# Patient Record
Sex: FEMALE
[De-identification: names, ages, dates, MRNs, and addresses within clinical notes are randomized per-mention and may not be internally consistent; named-entity substitution may affect disease eponyms.]

---

## 2019-09-06 ENCOUNTER — TRANSCRIPTION ENCOUNTER (OUTPATIENT)
Age: 19
End: 2019-09-06

## 2020-05-27 ENCOUNTER — TRANSCRIPTION ENCOUNTER (OUTPATIENT)
Age: 20
End: 2020-05-27

## 2024-01-13 ENCOUNTER — NON-APPOINTMENT (OUTPATIENT)
Age: 24
End: 2024-01-13

## 2024-01-13 ENCOUNTER — APPOINTMENT (OUTPATIENT)
Dept: ORTHOPEDIC SURGERY | Facility: CLINIC | Age: 24
End: 2024-01-13
Payer: COMMERCIAL

## 2024-01-13 DIAGNOSIS — S93.401A SPRAIN OF UNSPECIFIED LIGAMENT OF RIGHT ANKLE, INITIAL ENCOUNTER: ICD-10-CM

## 2024-01-13 PROBLEM — Z00.00 ENCOUNTER FOR PREVENTIVE HEALTH EXAMINATION: Status: ACTIVE | Noted: 2024-01-13

## 2024-01-13 PROCEDURE — L4361: CPT | Mod: RT

## 2024-01-13 PROCEDURE — 73610 X-RAY EXAM OF ANKLE: CPT | Mod: RT

## 2024-01-13 PROCEDURE — 99203 OFFICE O/P NEW LOW 30 MIN: CPT

## 2024-01-13 PROCEDURE — 73630 X-RAY EXAM OF FOOT: CPT | Mod: RT

## 2024-01-13 RX ORDER — MELOXICAM 15 MG/1
15 TABLET ORAL
Qty: 30 | Refills: 0 | Status: ACTIVE | COMMUNITY
Start: 2024-01-13 | End: 1900-01-01

## 2024-01-13 NOTE — PHYSICAL EXAM
[de-identified] : Physical exam of the right ankle: -Moderate swelling over the lateral aspect and anterior aspect of the ankle joint.  Skin intact -Tenderness to palpation along the ATFL, PTFL, anterior joint line and mild tenderness over the lateral malleolus.  No tenderness over medial malleolus, deltoid ligaments, foot. -Patient has limited range of motion due to swelling and pain -+2 dorsalis pedis pulse  -Sensation intact to light touch

## 2024-01-13 NOTE — DISCUSSION/SUMMARY
[de-identified] : Patient has a sprain of the right ankle.  At this time I placed patient a tall cam walking boot to weight-bear as tolerated.  Patient encouraged to elevate the foot, ice and I am prescribing naproxen 500 mg to take as needed for pain and inflammation.  Encouraged her to take with food as it is started on the stomach.  Patient was advised that this takes 4 to 6 weeks to fully heal and the first 2 weeks may be the worst.  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  Patient will follow-up in 3 to 4 weeks with me if needed.  Patient agrees to above plan all questions were answered today

## 2024-01-13 NOTE — DATA REVIEWED
[FreeTextEntry1] : X-ray images were obtained at the office today.  AP, lateral, oblique views of right foot and ankle weightbearing reveal no acute fractures, dislocations, bony abnormalities

## 2024-01-13 NOTE — HISTORY OF PRESENT ILLNESS
[de-identified] : 23-year-old female presents for right ankle injury.  Today, patient missed a step and twisted her ankle and fell.  Patient had noticed immediate swelling in the ankle and pain with weightbearing.  Patient denies any past injuries or surgeries to the area.  Has been taking ibuprofen 600 mg for pain relief.

## 2024-02-06 ENCOUNTER — APPOINTMENT (OUTPATIENT)
Dept: ORTHOPEDIC SURGERY | Facility: CLINIC | Age: 24
End: 2024-02-06